# Patient Record
Sex: FEMALE | Race: BLACK OR AFRICAN AMERICAN | Employment: FULL TIME | URBAN - METROPOLITAN AREA
[De-identification: names, ages, dates, MRNs, and addresses within clinical notes are randomized per-mention and may not be internally consistent; named-entity substitution may affect disease eponyms.]

---

## 2022-07-12 ENCOUNTER — OFFICE VISIT (OUTPATIENT)
Dept: ORTHOPEDIC SURGERY | Age: 52
End: 2022-07-12
Payer: COMMERCIAL

## 2022-07-12 VITALS — HEIGHT: 61 IN | WEIGHT: 198 LBS | BODY MASS INDEX: 37.38 KG/M2

## 2022-07-12 DIAGNOSIS — M25.551 RIGHT HIP PAIN: Primary | ICD-10-CM

## 2022-07-12 DIAGNOSIS — M70.61 TROCHANTERIC BURSITIS, RIGHT HIP: ICD-10-CM

## 2022-07-12 PROCEDURE — 99204 OFFICE O/P NEW MOD 45 MIN: CPT | Performed by: PHYSICIAN ASSISTANT

## 2022-07-12 PROCEDURE — 20610 DRAIN/INJ JOINT/BURSA W/O US: CPT | Performed by: PHYSICIAN ASSISTANT

## 2022-07-12 RX ORDER — DICLOFENAC SODIUM 75 MG/1
75 TABLET, DELAYED RELEASE ORAL 2 TIMES DAILY WITH MEALS
Qty: 60 TABLET | Refills: 0 | Status: SHIPPED | OUTPATIENT
Start: 2022-07-12

## 2022-07-12 RX ORDER — TRIAMCINOLONE ACETONIDE 40 MG/ML
40 INJECTION, SUSPENSION INTRA-ARTICULAR; INTRAMUSCULAR ONCE
Status: COMPLETED | OUTPATIENT
Start: 2022-07-12 | End: 2022-07-12

## 2022-07-12 RX ADMIN — TRIAMCINOLONE ACETONIDE 40 MG: 40 INJECTION, SUSPENSION INTRA-ARTICULAR; INTRAMUSCULAR at 11:19

## 2022-07-12 NOTE — PROGRESS NOTES
Clarissa Oakley (: 1970) is a 47 y.o. female patient, here for evaluation of the following chief complaint(s):  Hip Pain (right hip pain)       ASSESSMENT/PLAN:  Below is the assessment and plan developed based on review of pertinent history, physical exam, labs, studies, and medications. 27-year-old female comes in today for evaluation of right hip pain. She has 2 issues going on. She got some point tenderness over her right lateral hip. Has been going on intermittently for the last 6 months. Most likely consistent with trochanteric bursitis. Discussed treatment options with patient. She would like to try an injection. After verbal consent was obtained I injected 3mL Lidocaine and 40 mg triamcinolone into the right trochanteric bursa using sterile technique. Patient tolerated well. Prescription given for physical therapy to work on strength and conditioning. She also has some anterior groin pain that started 4 weeks ago. X-rays that show well-preserved joint space. Concern for labral pathology. States she had an MRI 2 to 3 years ago and it did show a small labral tear at that time. We will start patient on diclofenac 75 mg twice daily. Risks and benefits of medication discussed with patient. Patient verbalized understanding. Prescription also given for physical therapy to work on strength and conditioning. Plans to monitor symptoms if no improvement in the next 4 to 6 weeks would consider moving forward with an MRI for further evaluation given concern for labral tear. Follow-up sooner as needed. 1. Right hip pain  -     XR HIP RT W OR WO PELV 2-3 VWS; Future  -     diclofenac EC (VOLTAREN) 75 mg EC tablet; Take 1 Tablet by mouth two (2) times daily (with meals). , Normal, Disp-60 Tablet, R-0  -     REFERRAL TO PHYSICAL THERAPY  2. Trochanteric bursitis, right hip  -     diclofenac EC (VOLTAREN) 75 mg EC tablet; Take 1 Tablet by mouth two (2) times daily (with meals). , Normal, Disp-60 Tablet, R-0  -     REFERRAL TO PHYSICAL THERAPY  -     triamcinolone acetonide (KENALOG-40) 40 mg/mL injection 40 mg; 40 mg, Intra artICUlar, ONCE, 1 dose, On 22 at 1100  -     DRAIN/INJECT INTERMEDIATE JOINT/BURSA      Encounter Diagnoses   Name Primary?  Right hip pain Yes    Trochanteric bursitis, right hip         No follow-ups on file. SUBJECTIVE/OBJECTIVE:  John Pastor (: 1970) is a 47 y.o. female who presents today for the following:  Chief Complaint   Patient presents with    Hip Pain     right hip pain       50-year-old female comes in today for evaluation of right hip pain. She got some point tenderness over her right lateral hip. No radiation of symptoms. Has been dealing with this for 6+ months. Tender to sleep on at night. Over the last 4 weeks she also developed some anterior groin pain. States she has a history of a labral tear diagnosed to 3 years ago. Had been doing well until 4 weeks ago. She has tried some over-the-counter medications with no real relief in symptoms. She has not done physical therapy yet. IMAGING:  XR Results (most recent):  Results from Appointment encounter on 22    XR HIP RT W OR WO PELV 2-3 VWS    Narrative  3 views right hip x-rays ordered and personally reviewed. Overall joint space well-preserved. No overt osteoarthritic changes noted. Allergies   Allergen Reactions    Codeine Unknown (comments)    Seafood Unknown (comments)    Tree Nut Unknown (comments)       Current Outpatient Medications   Medication Sig    diclofenac EC (VOLTAREN) 75 mg EC tablet Take 1 Tablet by mouth two (2) times daily (with meals). Current Facility-Administered Medications   Medication    triamcinolone acetonide (KENALOG-40) 40 mg/mL injection 40 mg       No past medical history on file. No past surgical history on file. No family history on file.      Social History     Tobacco Use    Smoking status: Never Smoker    Smokeless tobacco: Never Used   Substance Use Topics    Alcohol use: Not Currently        All systems reviewed x 12 and were negative with the exception of None      No flowsheet data found. Vitals:  Ht 5' 1\" (1.549 m)   Wt 198 lb (89.8 kg)   BMI 37.41 kg/m²    Body mass index is 37.41 kg/m². Physical Exam    General: NAD, well developed, well nourished. Cardiac: Extremities well perfused. Respiratory: Nonlabored breathing. RLE: Mild antalgic gait. Positive stinchfield. 0-100 degrees of flexion. >20 degrees internal rotation, >30 degrees external rotation. Negative OSMANI. Positive for pain with flexion adduction and internal rotation. Point tender over lateral hip. Motor strength grossly intact. LLE: No antalgic gait. Negative stinchfield. 0-100 degrees of flexion. >20 degrees internal rotation, >30 degrees external rotation. Negative OSMANI. No pain with flexion adduction and internal rotation. .  Motor strength grossly intact. Skin: Warm well perfused. Vascular: Palpable pedal pulses bilaterally. Equal. Capillary refill less than 2 seconds. Louis Giordano M.D. was available for immediate consultation as the supervising physician. An electronic signature was used to authenticate this note.   -- Chao Daily PA-C